# Patient Record
Sex: MALE | Race: WHITE | Employment: OTHER | URBAN - METROPOLITAN AREA
[De-identification: names, ages, dates, MRNs, and addresses within clinical notes are randomized per-mention and may not be internally consistent; named-entity substitution may affect disease eponyms.]

---

## 2019-08-28 ENCOUNTER — HOSPITAL ENCOUNTER (INPATIENT)
Age: 74
LOS: 2 days | Discharge: ANOTHER HOSPITAL | DRG: 309 | End: 2019-08-30
Attending: EMERGENCY MEDICINE | Admitting: FAMILY MEDICINE
Payer: MEDICARE

## 2019-08-28 DIAGNOSIS — T82.110A FAILURE OF PACEMAKER LEAD, INITIAL ENCOUNTER: Primary | ICD-10-CM

## 2019-08-28 PROBLEM — I25.10 CORONARY ARTERY DISEASE: Status: ACTIVE | Noted: 2019-08-28

## 2019-08-28 LAB
ABSOLUTE EOS #: 0.19 K/UL (ref 0–0.44)
ABSOLUTE IMMATURE GRANULOCYTE: <0.03 K/UL (ref 0–0.3)
ABSOLUTE LYMPH #: 1.21 K/UL (ref 1.1–3.7)
ABSOLUTE MONO #: 0.47 K/UL (ref 0.1–1.2)
ALBUMIN SERPL-MCNC: 3.9 G/DL (ref 3.5–5.2)
ALBUMIN/GLOBULIN RATIO: 1.4 (ref 1–2.5)
ALP BLD-CCNC: 88 U/L (ref 40–129)
ALT SERPL-CCNC: 24 U/L (ref 5–41)
ANION GAP SERPL CALCULATED.3IONS-SCNC: 8 MMOL/L (ref 9–17)
AST SERPL-CCNC: 26 U/L
BASOPHILS # BLD: 1 % (ref 0–2)
BASOPHILS ABSOLUTE: 0.04 K/UL (ref 0–0.2)
BILIRUB SERPL-MCNC: 1.11 MG/DL (ref 0.3–1.2)
BUN BLDV-MCNC: 10 MG/DL (ref 8–23)
BUN/CREAT BLD: ABNORMAL (ref 9–20)
CALCIUM SERPL-MCNC: 8.6 MG/DL (ref 8.6–10.4)
CHLORIDE BLD-SCNC: 106 MMOL/L (ref 98–107)
CO2: 26 MMOL/L (ref 20–31)
CREAT SERPL-MCNC: 0.7 MG/DL (ref 0.7–1.2)
DIFFERENTIAL TYPE: ABNORMAL
EOSINOPHILS RELATIVE PERCENT: 3 % (ref 1–4)
GFR AFRICAN AMERICAN: >60 ML/MIN
GFR NON-AFRICAN AMERICAN: >60 ML/MIN
GFR SERPL CREATININE-BSD FRML MDRD: ABNORMAL ML/MIN/{1.73_M2}
GFR SERPL CREATININE-BSD FRML MDRD: ABNORMAL ML/MIN/{1.73_M2}
GLUCOSE BLD-MCNC: 107 MG/DL (ref 70–99)
HCT VFR BLD CALC: 44.5 % (ref 40.7–50.3)
HEMOGLOBIN: 14.6 G/DL (ref 13–17)
IMMATURE GRANULOCYTES: 0 %
INR BLD: 1
LYMPHOCYTES # BLD: 20 % (ref 24–43)
MCH RBC QN AUTO: 31.5 PG (ref 25.2–33.5)
MCHC RBC AUTO-ENTMCNC: 32.8 G/DL (ref 28.4–34.8)
MCV RBC AUTO: 95.9 FL (ref 82.6–102.9)
MONOCYTES # BLD: 8 % (ref 3–12)
NRBC AUTOMATED: 0 PER 100 WBC
PARTIAL THROMBOPLASTIN TIME: 24.1 SEC (ref 20.5–30.5)
PDW BLD-RTO: 12.6 % (ref 11.8–14.4)
PLATELET # BLD: ABNORMAL K/UL (ref 138–453)
PLATELET ESTIMATE: ABNORMAL
PLATELET, FLUORESCENCE: 116 K/UL (ref 138–453)
PLATELET, IMMATURE FRACTION: 3.1 % (ref 1.1–10.3)
PMV BLD AUTO: ABNORMAL FL (ref 8.1–13.5)
POTASSIUM SERPL-SCNC: 4 MMOL/L (ref 3.7–5.3)
PROTHROMBIN TIME: 10.9 SEC (ref 9–12)
RBC # BLD: 4.64 M/UL (ref 4.21–5.77)
RBC # BLD: ABNORMAL 10*6/UL
SEG NEUTROPHILS: 69 % (ref 36–65)
SEGMENTED NEUTROPHILS ABSOLUTE COUNT: 4.28 K/UL (ref 1.5–8.1)
SODIUM BLD-SCNC: 140 MMOL/L (ref 135–144)
TOTAL PROTEIN: 6.6 G/DL (ref 6.4–8.3)
WBC # BLD: 6.2 K/UL (ref 3.5–11.3)
WBC # BLD: ABNORMAL 10*3/UL

## 2019-08-28 PROCEDURE — 85610 PROTHROMBIN TIME: CPT

## 2019-08-28 PROCEDURE — 1200000000 HC SEMI PRIVATE

## 2019-08-28 PROCEDURE — G0378 HOSPITAL OBSERVATION PER HR: HCPCS

## 2019-08-28 PROCEDURE — 85055 RETICULATED PLATELET ASSAY: CPT

## 2019-08-28 PROCEDURE — 6370000000 HC RX 637 (ALT 250 FOR IP): Performed by: FAMILY MEDICINE

## 2019-08-28 PROCEDURE — 85025 COMPLETE CBC W/AUTO DIFF WBC: CPT

## 2019-08-28 PROCEDURE — 2580000003 HC RX 258: Performed by: PHYSICIAN ASSISTANT

## 2019-08-28 PROCEDURE — 99222 1ST HOSP IP/OBS MODERATE 55: CPT | Performed by: PHYSICIAN ASSISTANT

## 2019-08-28 PROCEDURE — 85730 THROMBOPLASTIN TIME PARTIAL: CPT

## 2019-08-28 PROCEDURE — 99284 EMERGENCY DEPT VISIT MOD MDM: CPT

## 2019-08-28 PROCEDURE — 80053 COMPREHEN METABOLIC PANEL: CPT

## 2019-08-28 PROCEDURE — 6370000000 HC RX 637 (ALT 250 FOR IP): Performed by: PHYSICIAN ASSISTANT

## 2019-08-28 RX ORDER — POTASSIUM CHLORIDE 20 MEQ/1
40 TABLET, EXTENDED RELEASE ORAL PRN
Status: DISCONTINUED | OUTPATIENT
Start: 2019-08-28 | End: 2019-08-31 | Stop reason: HOSPADM

## 2019-08-28 RX ORDER — METOPROLOL TARTRATE 50 MG/1
50 TABLET, FILM COATED ORAL 2 TIMES DAILY
Status: DISCONTINUED | OUTPATIENT
Start: 2019-08-28 | End: 2019-08-31 | Stop reason: HOSPADM

## 2019-08-28 RX ORDER — MAGNESIUM SULFATE 1 G/100ML
1 INJECTION INTRAVENOUS PRN
Status: DISCONTINUED | OUTPATIENT
Start: 2019-08-28 | End: 2019-08-31 | Stop reason: HOSPADM

## 2019-08-28 RX ORDER — ATORVASTATIN CALCIUM 40 MG/1
40 TABLET, FILM COATED ORAL DAILY
COMMUNITY

## 2019-08-28 RX ORDER — SOTALOL HYDROCHLORIDE 120 MG/1
120 TABLET ORAL 2 TIMES DAILY
Status: DISCONTINUED | OUTPATIENT
Start: 2019-08-28 | End: 2019-08-31 | Stop reason: HOSPADM

## 2019-08-28 RX ORDER — NICOTINE 21 MG/24HR
1 PATCH, TRANSDERMAL 24 HOURS TRANSDERMAL DAILY PRN
Status: DISCONTINUED | OUTPATIENT
Start: 2019-08-28 | End: 2019-08-31 | Stop reason: HOSPADM

## 2019-08-28 RX ORDER — POTASSIUM CHLORIDE 7.45 MG/ML
10 INJECTION INTRAVENOUS PRN
Status: DISCONTINUED | OUTPATIENT
Start: 2019-08-28 | End: 2019-08-31 | Stop reason: HOSPADM

## 2019-08-28 RX ORDER — ASPIRIN 81 MG/1
81 TABLET, CHEWABLE ORAL DAILY
Status: DISCONTINUED | OUTPATIENT
Start: 2019-08-28 | End: 2019-08-31 | Stop reason: HOSPADM

## 2019-08-28 RX ORDER — SOTALOL HYDROCHLORIDE 120 MG/1
120 TABLET ORAL 2 TIMES DAILY
COMMUNITY

## 2019-08-28 RX ORDER — ATORVASTATIN CALCIUM 40 MG/1
40 TABLET, FILM COATED ORAL DAILY
Status: DISCONTINUED | OUTPATIENT
Start: 2019-08-28 | End: 2019-08-31 | Stop reason: HOSPADM

## 2019-08-28 RX ORDER — ACETAMINOPHEN 325 MG/1
650 TABLET ORAL EVERY 4 HOURS PRN
Status: DISCONTINUED | OUTPATIENT
Start: 2019-08-28 | End: 2019-08-31 | Stop reason: HOSPADM

## 2019-08-28 RX ORDER — CETIRIZINE HYDROCHLORIDE 10 MG/1
10 TABLET ORAL DAILY
COMMUNITY

## 2019-08-28 RX ORDER — ONDANSETRON 2 MG/ML
4 INJECTION INTRAMUSCULAR; INTRAVENOUS EVERY 6 HOURS PRN
Status: DISCONTINUED | OUTPATIENT
Start: 2019-08-28 | End: 2019-08-31 | Stop reason: HOSPADM

## 2019-08-28 RX ORDER — SODIUM CHLORIDE 0.9 % (FLUSH) 0.9 %
10 SYRINGE (ML) INJECTION EVERY 12 HOURS SCHEDULED
Status: DISCONTINUED | OUTPATIENT
Start: 2019-08-28 | End: 2019-08-31 | Stop reason: HOSPADM

## 2019-08-28 RX ORDER — FLUTICASONE PROPIONATE 50 MCG
2 SPRAY, SUSPENSION (ML) NASAL DAILY
Status: DISCONTINUED | OUTPATIENT
Start: 2019-08-28 | End: 2019-08-31 | Stop reason: HOSPADM

## 2019-08-28 RX ORDER — CETIRIZINE HYDROCHLORIDE 10 MG/1
10 TABLET ORAL DAILY
Status: DISCONTINUED | OUTPATIENT
Start: 2019-08-28 | End: 2019-08-31 | Stop reason: HOSPADM

## 2019-08-28 RX ORDER — SODIUM CHLORIDE 0.9 % (FLUSH) 0.9 %
10 SYRINGE (ML) INJECTION PRN
Status: DISCONTINUED | OUTPATIENT
Start: 2019-08-28 | End: 2019-08-31 | Stop reason: HOSPADM

## 2019-08-28 RX ORDER — METOPROLOL TARTRATE 50 MG/1
50 TABLET, FILM COATED ORAL 2 TIMES DAILY
COMMUNITY

## 2019-08-28 RX ADMIN — SOTALOL HYDROCHLORIDE 120 MG: 120 TABLET ORAL at 22:52

## 2019-08-28 RX ADMIN — METOPROLOL TARTRATE 50 MG: 50 TABLET, FILM COATED ORAL at 22:47

## 2019-08-28 RX ADMIN — FLUTICASONE PROPIONATE 2 SPRAY: 50 SPRAY, METERED NASAL at 22:47

## 2019-08-28 RX ADMIN — Medication 10 ML: at 22:49

## 2019-08-28 ASSESSMENT — ENCOUNTER SYMPTOMS
EYE PAIN: 0
NAUSEA: 0
EYE REDNESS: 0
COUGH: 0
VOMITING: 0
SHORTNESS OF BREATH: 0
ABDOMINAL PAIN: 0
RHINORRHEA: 0
SORE THROAT: 0
BACK PAIN: 0
DIARRHEA: 0

## 2019-08-28 NOTE — ED PROVIDER NOTES
times daily      atorvastatin (LIPITOR) 40 MG tablet Take 40 mg by mouth daily      aspirin 81 MG tablet Take 81 mg by mouth daily      metoprolol tartrate (LOPRESSOR) 50 MG tablet Take 50 mg by mouth 2 times daily      cetirizine (ZYRTEC) 10 MG tablet Take 10 mg by mouth daily           ALLERGIES     is allergic to seasonal.  FAMILY HISTORY     has no family status information on file. SOCIAL HISTORY       Social History     Tobacco Use    Smoking status: Not on file   Substance Use Topics    Alcohol use: Not on file    Drug use: Not on file     PHYSICAL EXAM     INITIAL VITALS: /80   Pulse 63   Temp 97.9 °F (36.6 °C) (Oral)   Resp 18   SpO2 97%    Physical Exam   Constitutional: He is oriented to person, place, and time. He appears well-developed and well-nourished. No distress. HENT:   Head: Normocephalic and atraumatic. Nose: Nose normal.   Mouth/Throat: Oropharynx is clear and moist.   Eyes: Pupils are equal, round, and reactive to light. Conjunctivae and EOM are normal.   Cardiovascular: Normal rate, regular rhythm and normal heart sounds. Exam reveals no gallop and no friction rub. No murmur heard. Pulmonary/Chest: Effort normal and breath sounds normal. No respiratory distress. He has no wheezes. Peacemaker pocket in left upper chest, well-healed. No tenderness surrounding the pocket. Abdominal: Soft. Bowel sounds are normal. He exhibits no distension. There is no tenderness. Musculoskeletal: Normal range of motion. He exhibits no edema or tenderness. Neurological: He is alert and oriented to person, place, and time. He exhibits normal muscle tone. Coordination normal.   Skin: Skin is warm and dry. No rash noted. He is not diaphoretic. Nursing note and vitals reviewed. MEDICAL DECISION MAKIN-year-old male with history of MI and pacemaker/AICD insertion presenting with pacemaker alarm.   Alarm is been going off every 4 hours with no other actions; no shocks

## 2019-08-28 NOTE — H&P
[address]     HISTORY AND PHYSICAL EXAMINATION            Date:   8/28/2019  Patient name:  Naomi Renae  Date of admission:  8/28/2019 10:36 AM  MRN:   [id number]  Account:  [de-identified]  YOB: 1945  PCP:    Jaymie Horn MD  Room:   2007/2007-01  Code Status:    Full Code    Chief Complaint:     Chief Complaint   Patient presents with    Pacemaker Problem     sent by Dr. Humaira Bedoya d/t defibrillator making noises      History Obtained From:     patient, electronic medical record    History of Present Illness:     Naomi Renae is a 68 y.o. male with a past medical history significant for MI (1999) s/p PCI x 3 and AICD/PPM placement. Follows with Dr. Humaira Bedoya. Pt reports that he was functioning at his baseline of health prior to yesterday when he was awoken by his pacemaker alarming throughout the night. He contacted his cardiologist who recommended that he come to [facility] to get evaluated. He is denying any chest pain, shortness of breath, palpitations or shocks. His pacer lead was found to be fractured and he was admitted to internal medicine for further management. Past Medical History:     Past Medical History:   Diagnosis Date    AICD (automatic cardioverter/defibrillator) present     Coronary artery disease     MI (myocardial infarction) ([address]         Past Surgical History:     Past Surgical History:   Procedure Laterality Date    CARDIAC DEFIBRILLATOR PLACEMENT      CORONARY ANGIOPLASTY WITH STENT PLACEMENT          Medications Prior to Admission:     Prior to Admission medications    Medication Sig Start Date End Date Taking?  Authorizing Provider   sotalol (BETAPACE) 120 MG tablet Take 120 mg by mouth 2 times daily   Yes Historical Provider, MD   atorvastatin (LIPITOR) 40 MG tablet Take 40 mg by mouth daily   Yes Historical Provider, MD   aspirin 81 MG tablet Take 81 mg by mouth daily   Yes Historical

## 2019-08-28 NOTE — CARE COORDINATION
Case Management Initial Discharge Plan  Melanie Person,             Met with:patient to discuss discharge plans. Information verified: address, contacts, phone number, , insurance Yes  PCP: No primary care provider on file. Date of last visit: 1 month ago    Insurance Provider: [employer]    Discharge Planning    Living Arrangements:      Support Systems:       Home has 1 stories  3 stairs to climb to get into front door,     Patient able to perform ADL's:independent  Current Services (outpatient & in home) none  DME equipment: none  DME provider:     [facility] Medications:     Does patient want to participate in local refill/ meds to beds program?       Potential Assistance Needed:       Patient agreeable to home care: No  [place]land of choice provided:  n/a    Prior SNF/Rehab Placement and Facility:   Agreeable to SNF/Rehab: No  Inland of choice provided: n/a   Evaluation: no    Expected Discharge date:     Patient expects to be discharged to: Follow Up Appointment: Best Day/ Time:      Transportation provider: self  Transportation arrangements needed for discharge: No    Readmission Risk              Risk of Unplanned Readmission:        0             Does patient have a readmission risk score greater than 14?: not calculated. If yes, follow-up appointment must be made within 7 days of discharge. Discharge Plan: return to home, no skilled needs identified.           Electronically signed by Tessa Walker RN on 19 at 12:24 PM

## 2019-08-29 LAB
ANION GAP SERPL CALCULATED.3IONS-SCNC: 12 MMOL/L (ref 9–17)
BUN BLDV-MCNC: 10 MG/DL (ref 8–23)
BUN/CREAT BLD: ABNORMAL (ref 9–20)
CALCIUM SERPL-MCNC: 8.6 MG/DL (ref 8.6–10.4)
CHLORIDE BLD-SCNC: 107 MMOL/L (ref 98–107)
CO2: 23 MMOL/L (ref 20–31)
CREAT SERPL-MCNC: 0.66 MG/DL (ref 0.7–1.2)
GFR AFRICAN AMERICAN: >60 ML/MIN
GFR NON-AFRICAN AMERICAN: >60 ML/MIN
GFR SERPL CREATININE-BSD FRML MDRD: ABNORMAL ML/MIN/{1.73_M2}
GFR SERPL CREATININE-BSD FRML MDRD: ABNORMAL ML/MIN/{1.73_M2}
GLUCOSE BLD-MCNC: 113 MG/DL (ref 70–99)
HCT VFR BLD CALC: 43.2 % (ref 40.7–50.3)
HEMOGLOBIN: 14.6 G/DL (ref 13–17)
INR BLD: 1
MCH RBC QN AUTO: 32.8 PG (ref 25.2–33.5)
MCHC RBC AUTO-ENTMCNC: 33.8 G/DL (ref 28.4–34.8)
MCV RBC AUTO: 97.1 FL (ref 82.6–102.9)
NRBC AUTOMATED: 0 PER 100 WBC
PDW BLD-RTO: 12.5 % (ref 11.8–14.4)
PLATELET # BLD: 146 K/UL (ref 138–453)
PMV BLD AUTO: 11.5 FL (ref 8.1–13.5)
POTASSIUM SERPL-SCNC: 4.5 MMOL/L (ref 3.7–5.3)
PROTHROMBIN TIME: 11 SEC (ref 9–12)
RBC # BLD: 4.45 M/UL (ref 4.21–5.77)
SODIUM BLD-SCNC: 142 MMOL/L (ref 135–144)
WBC # BLD: 8.4 K/UL (ref 3.5–11.3)

## 2019-08-29 PROCEDURE — 80048 BASIC METABOLIC PNL TOTAL CA: CPT

## 2019-08-29 PROCEDURE — 85027 COMPLETE CBC AUTOMATED: CPT

## 2019-08-29 PROCEDURE — 2580000003 HC RX 258: Performed by: PHYSICIAN ASSISTANT

## 2019-08-29 PROCEDURE — G0378 HOSPITAL OBSERVATION PER HR: HCPCS

## 2019-08-29 PROCEDURE — 1200000000 HC SEMI PRIVATE

## 2019-08-29 PROCEDURE — 6370000000 HC RX 637 (ALT 250 FOR IP): Performed by: PHYSICIAN ASSISTANT

## 2019-08-29 PROCEDURE — 96372 THER/PROPH/DIAG INJ SC/IM: CPT

## 2019-08-29 PROCEDURE — 99232 SBSQ HOSP IP/OBS MODERATE 35: CPT | Performed by: FAMILY MEDICINE

## 2019-08-29 PROCEDURE — 6370000000 HC RX 637 (ALT 250 FOR IP): Performed by: FAMILY MEDICINE

## 2019-08-29 PROCEDURE — 6360000002 HC RX W HCPCS: Performed by: PHYSICIAN ASSISTANT

## 2019-08-29 PROCEDURE — 85610 PROTHROMBIN TIME: CPT

## 2019-08-29 PROCEDURE — APPSS15 APP SPLIT SHARED TIME 0-15 MINUTES: Performed by: PHYSICIAN ASSISTANT

## 2019-08-29 PROCEDURE — 36415 COLL VENOUS BLD VENIPUNCTURE: CPT

## 2019-08-29 RX ADMIN — SOTALOL HYDROCHLORIDE 120 MG: 120 TABLET ORAL at 20:03

## 2019-08-29 RX ADMIN — METOPROLOL TARTRATE 50 MG: 50 TABLET, FILM COATED ORAL at 09:12

## 2019-08-29 RX ADMIN — CETIRIZINE HYDROCHLORIDE 10 MG: 10 TABLET ORAL at 09:12

## 2019-08-29 RX ADMIN — METOPROLOL TARTRATE 50 MG: 50 TABLET, FILM COATED ORAL at 20:02

## 2019-08-29 RX ADMIN — DESMOPRESSIN ACETATE 40 MG: 0.2 TABLET ORAL at 09:12

## 2019-08-29 RX ADMIN — ENOXAPARIN SODIUM 40 MG: 40 INJECTION SUBCUTANEOUS at 09:12

## 2019-08-29 RX ADMIN — Medication 10 ML: at 20:03

## 2019-08-29 RX ADMIN — ASPIRIN 81 MG: 81 TABLET, CHEWABLE ORAL at 09:12

## 2019-08-29 RX ADMIN — SOTALOL HYDROCHLORIDE 120 MG: 120 TABLET ORAL at 09:12

## 2019-08-29 RX ADMIN — FLUTICASONE PROPIONATE 2 SPRAY: 50 SPRAY, METERED NASAL at 09:12

## 2019-08-29 RX ADMIN — Medication 10 ML: at 09:13

## 2019-08-29 NOTE — PROGRESS NOTES
[facility]    Progress Note    8/29/2019    8:56 AM    Name:   Elisabet Kemp  MRN:     [id number]     Acct:      [de-identified]   Room:   2007/2007-01  IP Day:  1  Admit Date:  8/28/2019 10:36 AM    PCP:   Angelo Wang MD  Code Status:  Full Code    Subjective:     C/C:   Chief Complaint   Patient presents with   [employer] Pacemaker Problem     sent by Dr. Yariel Patino d/t defibrillator making noises      Interval History Status: not changed. Pt with no complaints. Resting in bed, eating breakfast upon my evaluation. Denying chest pain, shortness of breath or palpitations. Vitals reviewed and stable. Awaiting intervention for fractured pacemaker lead. Brief History:     Elisabet Kemp is a 68 y.o. male with a past medical history significant for MI (1999) s/p PCI x 3 and AICD/PPM placement. Follows with Dr. Yariel Patino. Pt reports that he was functioning at his baseline of health prior to yesterday when he was awoken by his pacemaker alarming throughout the night. He contacted his cardiologist who recommended that he come to [facility] to get evaluated. He is denying any chest pain, shortness of breath, palpitations or shocks. His pacer lead was found to be fractured and he was admitted to internal medicine for further management. Review of Systems:     Constitutional:  negative for chills, fevers, sweats  Respiratory:  negative for cough, dyspnea on exertion, hemoptysis, shortness of breath, wheezing  Cardiovascular:  negative for chest pain, chest pressure/discomfort, lower extremity edema, palpitations  Gastrointestinal:  negative for abdominal pain, constipation, diarrhea, nausea, vomiting  Neurological:  negative for dizziness, headache    Medications: Allergies:     Allergies   Allergen Reactions    Seasonal        Current Meds:   Scheduled Meds:    aspirin  81 mg Oral Daily    atorvastatin  40 mg Oral Daily    cetirizine  10 mg Oral Daily    metoprolol tartrate  50 mg Oral BID    sotalol  120 mg Oral BID    sodium chloride flush  10 mL Intravenous 2 times per day    enoxaparin  40 mg Subcutaneous Daily    fluticasone  2 spray Each Nostril Daily     Continuous Infusions:   PRN Meds: sodium chloride flush, potassium chloride **OR** potassium alternative oral replacement **OR** potassium chloride, magnesium sulfate, magnesium hydroxide, ondansetron, nicotine, acetaminophen    Data:     Past Medical History:   has a past medical history of AICD (automatic cardioverter/defibrillator) present, Coronary artery disease, and MI (myocardial infarction) ([facility] [address] Social History:        Family History: No family history on file. Vitals:  /88   Pulse 63   Temp 98.1 °F (36.7 °C) (Oral)   Resp 18   SpO2 98%   Temp (24hrs), Av.9 °F (36.6 °C), Min:97.6 °F (36.4 °C), Max:98.1 °F (36.7 °C)    No results for input(s): POCGLU in the last 72 hours. I/O (24Hr): No intake or output data in the 24 hours ending 19 0856    Labs:  Hematology:  Recent Labs     19  11319  0522   WBC 6.2 8.4   RBC 4.64 4.45   HGB 14.6 14.6   HCT 44.5 43.2   MCV 95.9 97.1   MCH 31.5 32.8   MCHC 32.8 33.8   RDW 12.6 12.5   PLT See Reflexed IPF Result 146   MPV NOT REPORTED 11.5   INR 1.0 1.0     Chemistry:  Recent Labs     19  11319  0522    142   K 4.0 4.5    107   CO2 26 23   GLUCOSE 107* 113*   BUN 10 10   CREATININE 0.70 0.66*   ANIONGAP 8* 12   LABGLOM >60 >60   GFRAA >60 >60   CALCIUM 8.6 8.6     Recent Labs     19  1136   PROT 6.6   LABALBU 3.9   AST 26   ALT 24   ALKPHOS 88   BILITOT 1.11     ABG:No results found for: POCPH, PHART, PH, POCPCO2, PYQ6QHT, PCO2, POCPO2, PO2ART, PO2, POCHCO3, UQY1RSF, HCO3, NBEA, PBEA, BEART, BE, THGBART, THB, ZDM6XLF, QXLJ2WHU, L9VDZXIY, O2SAT, FIO2  No results found for: SPECIAL  No results found for: CULTURE    Radiology:  No results found.     Physical Examination:

## 2019-08-29 NOTE — CONSULTS
CARDIOLOGY NOTE    69 yo  male with fractured ICD lead (noise on v-sensing. No inapropriate shock. VT sensing programmed off. Pacing DDDR normally @ 60/min, underlying sinus bradycardia, not dependant. ANALYSIS   - malfunction ICD lead: over-sensing suggesting lead fracture. Still pacig  - sinus node dysfunction  - ischemic cardiomyopathy  - controlled chronic HFrEF    PLAN  -no interventional EP available. Transfer to [facility]. They have no beds today, likely tomorrw. I have spoken with Admission Officer at [facility] by phone & arranged transfer    Frida Hartman MD [facility]    Last Refreshed: 08/29/19 1347 [Time Ralph Orders]   Results      Component Value Units   Basic Metabolic Panel w/ Reflex to MG [[id number]] (Abnormal) Collected: 08/29/19 0522   Updated: 08/29/19 0708     Glucose 113High  mg/dL    BUN 10 mg/dL    CREATININE 0.66Low  mg/dL    Bun/Cre Ratio NOT REPORTED    Calcium 8.6 mg/dL    Sodium 142 mmol/L    Potassium 4.5 mmol/L    Chloride 107 mmol/L    CO2 23 mmol/L    Anion Gap 12 mmol/L    GFR Non-African American >60 mL/min    GFR African American >60 mL/min    GFR Comment         Comment: Average GFR for 79or more years old:    65 mL/min/1.73sq m   Chronic Kidney Disease:    <60 mL/min/1.73sq m   Kidney failure:    <15 mL/min/1.73sq m               eGFR calculated using average adult body mass. Additional eGFR calculator available at:         [email or web address]              GFR Staging NOT REPORTED   Protime-INR [[id number]] Collected: 08/29/19 0522   Updated: 08/29/19 0600     Protime 11.0 sec    INR 1.0    Comment:        Therapeutic Range:    Moderate Anticoagulant Intensity:      INR = 2.0-3.0    High Anticoagulant Intensity:      INR = 2.5-3. 5             CBC [[id number]] Collected: 08/29/19 0522   Updated: 08/29/19 0554     WBC 8.4 k/uL    RBC 4.45 m/uL    Hemoglobin 14.6 g/dL    Hematocrit 43.2 %    MCV 97.1 fL    MCH 32.8 pg    MCHC 33.8 g/dL    RDW 12.5 %    Platelets 770 k/uL    MPV 11.5 fL    NRBC Automated 0.0 per 100 WBC   Current IP Meds   Hide   (From admission, onward)     Frequency    sodium chloride flush 0.9 % injection 10 mL (Saline Flushes)    Discontinue    2 times per day    fluticasone (FLONASE) 50 MCG/ACT nasal spray 2 spray    Discontinue    DAILY    aspirin chewable tablet 81 mg    Discontinue    DAILY    atorvastatin (LIPITOR) tablet 40 mg    Discontinue    DAILY    cetirizine (ZYRTEC) tablet 10 mg    Discontinue    DAILY    metoprolol tartrate (LOPRESSOR) tablet 50 mg    Discontinue    2 TIMES DAILY    sotalol (BETAPACE) tablet 120 mg    Discontinue    2 TIMES DAILY    enoxaparin (LOVENOX) injection 40 mg    Discontinue    DAILY    potassium chloride (KLOR-CON M) extended release tablet 40 mEq (Potassium Oral and IV Replacement)    Discontinue   \"Or\" Linked Group Details    PRN    potassium bicarb-citric acid (EFFER-K) effervescent tablet 40 mEq (Potassium Oral and IV Replacement)    Discontinue   \"Or\" Linked Group Details    PRN    potassium chloride 10 mEq/100 mL IVPB (Peripheral Line) (Potassium Oral and IV Replacement)    Discontinue   \"Or\" Linked Group Details    PRN    sodium chloride flush 0.9 % injection 10 mL (Saline Flushes)    Discontinue    PRN    magnesium sulfate 1 g in dextrose 5% 100 mL IVPB    Discontinue    PRN    magnesium hydroxide (MILK OF MAGNESIA) 400 MG/5ML suspension 30 mL (Bowel Management - 1st Line PRN)    Discontinue    DAILY PRN    ondansetron (ZOFRAN) injection 4 mg    Discontinue    EVERY 6 HOURS PRN    nicotine (NICODERM CQ) 21 MG/24HR 1 patch    Discontinue    DAILY PRN    acetaminophen (TYLENOL) tablet 650 mg    Discontinue    EVERY 4 HOURS PRN

## 2019-08-30 ENCOUNTER — ANESTHESIA (OUTPATIENT)
Dept: OPERATING ROOM | Age: 74
DRG: 309 | End: 2019-08-30
Payer: MEDICARE

## 2019-08-30 ENCOUNTER — ANESTHESIA EVENT (OUTPATIENT)
Dept: OPERATING ROOM | Age: 74
DRG: 309 | End: 2019-08-30
Payer: MEDICARE

## 2019-08-30 VITALS
TEMPERATURE: 97.8 F | RESPIRATION RATE: 18 BRPM | WEIGHT: 185 LBS | DIASTOLIC BLOOD PRESSURE: 76 MMHG | OXYGEN SATURATION: 95 % | HEART RATE: 73 BPM | SYSTOLIC BLOOD PRESSURE: 131 MMHG

## 2019-08-30 PROCEDURE — APPSS15 APP SPLIT SHARED TIME 0-15 MINUTES: Performed by: PHYSICIAN ASSISTANT

## 2019-08-30 PROCEDURE — 6370000000 HC RX 637 (ALT 250 FOR IP): Performed by: PHYSICIAN ASSISTANT

## 2019-08-30 PROCEDURE — 6360000002 HC RX W HCPCS: Performed by: PHYSICIAN ASSISTANT

## 2019-08-30 PROCEDURE — 96372 THER/PROPH/DIAG INJ SC/IM: CPT

## 2019-08-30 PROCEDURE — 99232 SBSQ HOSP IP/OBS MODERATE 35: CPT | Performed by: FAMILY MEDICINE

## 2019-08-30 PROCEDURE — G0378 HOSPITAL OBSERVATION PER HR: HCPCS

## 2019-08-30 PROCEDURE — 2580000003 HC RX 258: Performed by: PHYSICIAN ASSISTANT

## 2019-08-30 RX ADMIN — CETIRIZINE HYDROCHLORIDE 10 MG: 10 TABLET ORAL at 08:50

## 2019-08-30 RX ADMIN — SOTALOL HYDROCHLORIDE 120 MG: 120 TABLET ORAL at 08:50

## 2019-08-30 RX ADMIN — FLUTICASONE PROPIONATE 2 SPRAY: 50 SPRAY, METERED NASAL at 08:53

## 2019-08-30 RX ADMIN — DESMOPRESSIN ACETATE 40 MG: 0.2 TABLET ORAL at 08:50

## 2019-08-30 RX ADMIN — METOPROLOL TARTRATE 50 MG: 50 TABLET, FILM COATED ORAL at 08:50

## 2019-08-30 RX ADMIN — ASPIRIN 81 MG: 81 TABLET, CHEWABLE ORAL at 08:50

## 2019-08-30 RX ADMIN — Medication 10 ML: at 08:51

## 2019-08-30 RX ADMIN — ENOXAPARIN SODIUM 40 MG: 40 INJECTION SUBCUTANEOUS at 08:50

## 2019-08-30 ASSESSMENT — PAIN SCALES - GENERAL: PAINLEVEL_OUTOF10: 0

## 2019-08-30 NOTE — PROGRESS NOTES
[facility]    Progress Note    8/30/2019    8:21 AM    Name:   Debra José  MRN:     [id number]     [place]:      [de-identified]   Room:   2007/2007-01  IP Day:  2  Admit Date:  8/28/2019 10:36 AM    PCP:   Yannick Burch MD  Code Status:  Full Code    Subjective:     C/C:   Chief Complaint   Patient presents with   [facility] Pacemaker Problem     sent by Dr. Sukumar Bazzi d/t defibrillator making noises      Interval History Status: not changed. Pt with no complaints. Resting in bed. Denying chest pain, shortness of breath or palpitations. Vitals reviewed and stable. Brief History:     Debra José is a 68 y.o. male with a past medical history significant for MI (1999) s/p PCI x 3 and AICD/PPM placement. Follows with Dr. Sukumar Bazzi. Pt reports that he was functioning at his baseline of health prior to yesterday when he was awoken by his pacemaker alarming throughout the night. He contacted his cardiologist who recommended that he come to [facility] to get evaluated. He is denying any chest pain, shortness of breath, palpitations or shocks. His pacer lead was found to be fractured and he was admitted to internal medicine for further management. Review of Systems:     Constitutional:  negative for chills, fevers, sweats  Respiratory:  negative for cough, dyspnea on exertion, hemoptysis, shortness of breath, wheezing  Cardiovascular:  negative for chest pain, chest pressure/discomfort, lower extremity edema, palpitations  Gastrointestinal:  negative for abdominal pain, constipation, diarrhea, nausea, vomiting  Neurological:  negative for dizziness, headache    Medications: Allergies:     Allergies   Allergen Reactions    Seasonal        Current Meds:   Scheduled Meds:    aspirin  81 mg Oral Daily    atorvastatin  40 mg Oral Daily    cetirizine  10 mg Oral Daily    metoprolol tartrate  50 mg Oral BID    sotalol  120 mg Oral BID    sodium chloride flush  10 mL Intravenous 2 times per day    enoxaparin  40 mg Subcutaneous Daily    fluticasone  2 spray Each Nostril Daily     Continuous Infusions:   PRN Meds: sodium chloride flush, potassium chloride **OR** potassium alternative oral replacement **OR** potassium chloride, magnesium sulfate, magnesium hydroxide, ondansetron, nicotine, acetaminophen    Data:     Past Medical History:   has a past medical history of AICD (automatic cardioverter/defibrillator) present, Coronary artery disease, and MI (myocardial infarction) ([facility] [address] Social History:   reports that he has quit smoking. He does not have any smokeless tobacco history on file. Family History: No family history on file. Vitals:  /64   Pulse 77   Temp 97.7 °F (36.5 °C) (Oral)   Resp 21   SpO2 97%   Temp (24hrs), Av.7 °F (36.5 °C), Min:97.7 °F (36.5 °C), Max:97.7 °F (36.5 °C)    No results for input(s): POCGLU in the last 72 hours. I/O (24Hr): No intake or output data in the 24 hours ending 19 0821    Labs:  Hematology:  Recent Labs     19  11319  0522   WBC 6.2 8.4   RBC 4.64 4.45   HGB 14.6 14.6   HCT 44.5 43.2   MCV 95.9 97.1   MCH 31.5 32.8   MCHC 32.8 33.8   RDW 12.6 12.5   PLT See Reflexed IPF Result 146   MPV NOT REPORTED 11.5   INR 1.0 1.0     Chemistry:  Recent Labs     19  11319  0522    142   K 4.0 4.5    107   CO2 26 23   GLUCOSE 107* 113*   BUN 10 10   CREATININE 0.70 0.66*   ANIONGAP 8* 12   LABGLOM >60 >60   GFRAA >60 >60   CALCIUM 8.6 8.6     Recent Labs     19  113   PROT 6.6   LABALBU 3.9   AST 26   ALT 24   ALKPHOS 88   BILITOT 1.11     ABG:No results found for: POCPH, PHART, PH, POCPCO2, SWL4PUV, PCO2, POCPO2, PO2ART, PO2, POCHCO3, NQB6FMD, HCO3, NBEA, PBEA, BEART, BE, THGBART, THB, XOR0UVK, ZWVS2PZC, S3NTWYCM, O2SAT, FIO2  No results found for: SPECIAL  No results found for: CULTURE    Radiology:  No results found.     Physical Examination: General appearance:  alert, cooperative and no distress  Mental Status:  oriented to person, place and time and normal affect  Lungs:  clear to auscultation bilaterally, normal effort  Heart:  regular rate and rhythm, no murmur  Abdomen:  soft, nontender, nondistended, normal bowel sounds, no masses, hepatomegaly, splenomegaly  Extremities:  no edema, redness, tenderness in the calves  Skin:  no gross lesions, rashes, induration    Assessment:        Hospital Problems           Last Modified POA    Pacemaker lead failure 8/29/2019 Yes    Coronary artery disease 8/28/2019 Yes    NICM (nonischemic cardiomyopathy) ([facility] [address] 8/29/2019 Yes          Plan:        Patient status Admit as inpatient in the  Med/Surge     1. Awaiting pacemaker lead repair/replacement; plan is for transfer to ; however, there is currently no bed availability. Will discuss case with Dr. Luis F Grant and potentially arrange for life vest placement so the patient can get out of the hospital  2. Continue medical management with sotalol, metoprolol, aspirin, atorvastatin  3. Tele  4.  Monitor and control blood pressure    Hallie Cunningham PA-C  8/30/2019  8:21 AM

## 2019-08-30 NOTE — ANESTHESIA PRE PROCEDURE
reviewed  Rhythm: regular  Rate: normal  Echocardiogram reviewed                  Neuro/Psych:   Negative Neuro/Psych ROS              GI/Hepatic/Renal: Neg GI/Hepatic/Renal ROS            Endo/Other: Negative Endo/Other ROS                    Abdominal:           Vascular: negative vascular ROS. Anesthesia Plan      MAC     ASA 4       Induction: intravenous. Anesthetic plan and risks discussed with patient. Use of blood products discussed with patient whom consented to blood products. Plan discussed with CRNA.                   Jia Donaldson MD   8/30/2019

## 2019-08-30 NOTE — PROGRESS NOTES
Cardiology    No bed currently available at [facility] today. EF 30%11/7/18 on persantine stress MPI  ICD DDD, 2010 for secondary prevention of  VT, Hx anterior MI 1999,  EF <35%  Underlying rhythm sinus bradycardia. Currently his [employer] Kiet ICD lead is prorammed off his sensing of ventricular  Tachy arrhythmias. Life Vest needed pending ICD lead replaced.     Ricardo Valladares MD [address]

## 2019-08-30 NOTE — PROGRESS NOTES
Dr. Laura Vergara called and writer updated physician patient is going to [facility]. Updated patient on plan of care.